# Patient Record
Sex: FEMALE | Race: WHITE | NOT HISPANIC OR LATINO | Employment: UNEMPLOYED | URBAN - METROPOLITAN AREA
[De-identification: names, ages, dates, MRNs, and addresses within clinical notes are randomized per-mention and may not be internally consistent; named-entity substitution may affect disease eponyms.]

---

## 2023-10-26 ENCOUNTER — ANCILLARY PROCEDURE (OUTPATIENT)
Dept: RADIOLOGY | Facility: HOSPITAL | Age: 33
End: 2023-10-26
Payer: COMMERCIAL

## 2023-10-26 ENCOUNTER — PHARMACY VISIT (OUTPATIENT)
Dept: PHARMACY | Facility: CLINIC | Age: 33
End: 2023-10-26
Payer: MEDICARE

## 2023-10-26 ENCOUNTER — HOSPITAL ENCOUNTER (OUTPATIENT)
Facility: HOSPITAL | Age: 33
Discharge: HOME | End: 2023-10-26
Payer: COMMERCIAL

## 2023-10-26 ENCOUNTER — HOSPITAL ENCOUNTER (OUTPATIENT)
Dept: VASCULAR MEDICINE | Facility: HOSPITAL | Age: 33
Discharge: HOME | End: 2023-10-26
Payer: COMMERCIAL

## 2023-10-26 ENCOUNTER — HOSPITAL ENCOUNTER (EMERGENCY)
Facility: HOSPITAL | Age: 33
Discharge: HOME | End: 2023-10-26
Attending: EMERGENCY MEDICINE
Payer: COMMERCIAL

## 2023-10-26 VITALS
BODY MASS INDEX: 26.22 KG/M2 | DIASTOLIC BLOOD PRESSURE: 61 MMHG | RESPIRATION RATE: 18 BRPM | HEIGHT: 63 IN | HEART RATE: 78 BPM | WEIGHT: 148 LBS | SYSTOLIC BLOOD PRESSURE: 116 MMHG | TEMPERATURE: 98.6 F | OXYGEN SATURATION: 99 %

## 2023-10-26 DIAGNOSIS — M79.661 PAIN IN RIGHT LOWER LEG: ICD-10-CM

## 2023-10-26 DIAGNOSIS — I82.411 ACUTE DEEP VEIN THROMBOSIS (DVT) OF FEMORAL VEIN OF RIGHT LOWER EXTREMITY (MULTI): Primary | ICD-10-CM

## 2023-10-26 LAB
ALBUMIN SERPL BCP-MCNC: 4.6 G/DL (ref 3.4–5)
ALP SERPL-CCNC: 76 U/L (ref 33–110)
ALT SERPL W P-5'-P-CCNC: 11 U/L (ref 7–45)
ANION GAP SERPL CALC-SCNC: 12 MMOL/L (ref 10–20)
APTT PPP: 33 SECONDS (ref 27–38)
AST SERPL W P-5'-P-CCNC: 15 U/L (ref 9–39)
BASOPHILS # BLD AUTO: 0.04 X10*3/UL (ref 0–0.1)
BASOPHILS NFR BLD AUTO: 0.5 %
BILIRUB SERPL-MCNC: 0.7 MG/DL (ref 0–1.2)
BUN SERPL-MCNC: 9 MG/DL (ref 6–23)
CALCIUM SERPL-MCNC: 9.6 MG/DL (ref 8.6–10.3)
CHLORIDE SERPL-SCNC: 102 MMOL/L (ref 98–107)
CO2 SERPL-SCNC: 28 MMOL/L (ref 21–32)
CREAT SERPL-MCNC: 0.69 MG/DL (ref 0.5–1.05)
EOSINOPHIL # BLD AUTO: 0.09 X10*3/UL (ref 0–0.7)
EOSINOPHIL NFR BLD AUTO: 1.1 %
ERYTHROCYTE [DISTWIDTH] IN BLOOD BY AUTOMATED COUNT: 12.4 % (ref 11.5–14.5)
GFR SERPL CREATININE-BSD FRML MDRD: >90 ML/MIN/1.73M*2
GLUCOSE SERPL-MCNC: 81 MG/DL (ref 74–99)
HCT VFR BLD AUTO: 44.2 % (ref 36–46)
HGB BLD-MCNC: 14.2 G/DL (ref 12–16)
IMM GRANULOCYTES # BLD AUTO: 0.02 X10*3/UL (ref 0–0.7)
IMM GRANULOCYTES NFR BLD AUTO: 0.3 % (ref 0–0.9)
INR PPP: 1.1 (ref 0.9–1.1)
LYMPHOCYTES # BLD AUTO: 1.79 X10*3/UL (ref 1.2–4.8)
LYMPHOCYTES NFR BLD AUTO: 22.6 %
MCH RBC QN AUTO: 27.6 PG (ref 26–34)
MCHC RBC AUTO-ENTMCNC: 32.1 G/DL (ref 32–36)
MCV RBC AUTO: 86 FL (ref 80–100)
MONOCYTES # BLD AUTO: 0.37 X10*3/UL (ref 0.1–1)
MONOCYTES NFR BLD AUTO: 4.7 %
NEUTROPHILS # BLD AUTO: 5.61 X10*3/UL (ref 1.2–7.7)
NEUTROPHILS NFR BLD AUTO: 70.8 %
NRBC BLD-RTO: 0 /100 WBCS (ref 0–0)
PLATELET # BLD AUTO: 357 X10*3/UL (ref 150–450)
PMV BLD AUTO: 8.8 FL (ref 7.5–11.5)
POTASSIUM SERPL-SCNC: 4.2 MMOL/L (ref 3.5–5.3)
PROT SERPL-MCNC: 7.9 G/DL (ref 6.4–8.2)
PROTHROMBIN TIME: 12.7 SECONDS (ref 9.8–12.8)
RBC # BLD AUTO: 5.14 X10*6/UL (ref 4–5.2)
SODIUM SERPL-SCNC: 138 MMOL/L (ref 136–145)
WBC # BLD AUTO: 7.9 X10*3/UL (ref 4.4–11.3)

## 2023-10-26 PROCEDURE — 85730 THROMBOPLASTIN TIME PARTIAL: CPT | Performed by: PHYSICIAN ASSISTANT

## 2023-10-26 PROCEDURE — 93971 EXTREMITY STUDY: CPT

## 2023-10-26 PROCEDURE — 93971 EXTREMITY STUDY: CPT | Performed by: RADIOLOGY

## 2023-10-26 PROCEDURE — 80053 COMPREHEN METABOLIC PANEL: CPT | Performed by: PHYSICIAN ASSISTANT

## 2023-10-26 PROCEDURE — 99284 EMERGENCY DEPT VISIT MOD MDM: CPT | Performed by: EMERGENCY MEDICINE

## 2023-10-26 PROCEDURE — 36415 COLL VENOUS BLD VENIPUNCTURE: CPT | Performed by: PHYSICIAN ASSISTANT

## 2023-10-26 PROCEDURE — RXMED WILLOW AMBULATORY MEDICATION CHARGE

## 2023-10-26 PROCEDURE — 93971 EXTREMITY STUDY: CPT | Performed by: SURGERY

## 2023-10-26 PROCEDURE — 85025 COMPLETE CBC W/AUTO DIFF WBC: CPT | Performed by: PHYSICIAN ASSISTANT

## 2023-10-26 PROCEDURE — 85610 PROTHROMBIN TIME: CPT | Performed by: PHYSICIAN ASSISTANT

## 2023-10-26 RX ORDER — APIXABAN 5 MG (74)
KIT ORAL
Qty: 74 TABLET | Refills: 0 | Status: SHIPPED | OUTPATIENT
Start: 2023-10-26 | End: 2024-01-10 | Stop reason: WASHOUT

## 2023-10-26 ASSESSMENT — LIFESTYLE VARIABLES
EVER FELT BAD OR GUILTY ABOUT YOUR DRINKING: NO
HAVE PEOPLE ANNOYED YOU BY CRITICIZING YOUR DRINKING: NO
HAVE YOU EVER FELT YOU SHOULD CUT DOWN ON YOUR DRINKING: NO
EVER HAD A DRINK FIRST THING IN THE MORNING TO STEADY YOUR NERVES TO GET RID OF A HANGOVER: NO
REASON UNABLE TO ASSESS: YES

## 2023-10-26 ASSESSMENT — COLUMBIA-SUICIDE SEVERITY RATING SCALE - C-SSRS
6. HAVE YOU EVER DONE ANYTHING, STARTED TO DO ANYTHING, OR PREPARED TO DO ANYTHING TO END YOUR LIFE?: NO
1. IN THE PAST MONTH, HAVE YOU WISHED YOU WERE DEAD OR WISHED YOU COULD GO TO SLEEP AND NOT WAKE UP?: NO
2. HAVE YOU ACTUALLY HAD ANY THOUGHTS OF KILLING YOURSELF?: NO

## 2023-10-26 ASSESSMENT — PAIN DESCRIPTION - ORIENTATION: ORIENTATION: RIGHT

## 2023-10-26 ASSESSMENT — PAIN DESCRIPTION - DESCRIPTORS: DESCRIPTORS: ACHING

## 2023-10-26 ASSESSMENT — PAIN DESCRIPTION - LOCATION: LOCATION: LEG

## 2023-10-26 ASSESSMENT — PAIN DESCRIPTION - PAIN TYPE: TYPE: ACUTE PAIN

## 2023-10-26 ASSESSMENT — PAIN SCALES - GENERAL: PAINLEVEL_OUTOF10: 2

## 2023-10-26 ASSESSMENT — PAIN - FUNCTIONAL ASSESSMENT: PAIN_FUNCTIONAL_ASSESSMENT: 0-10

## 2023-10-26 NOTE — ED TRIAGE NOTES
Pt presented to the ED with c/o right leg DVT. Pt was seen at the vascular lab today for a US and was positive for a DVT. Pt states that this has been going on for a while and has extreme pain in RLE where she was unable to walk. Pt recently had a trip to [place] and had a 14 hour flight there and back but pain started prior to trip. Pt does have nexplanon for birth control and has an extensive medical history with intracranial HTN and aneurysm. Pt has no history of blood clots and is not on a blood thinner. +2 pedal pulse with +1 edema to RLE. Pt able to move extremity with little to no pain.

## 2023-10-26 NOTE — Clinical Note
Zeinab Castellanos was seen and treated in our emergency department on 10/26/2023.  She may return to work on 10/27/2023.       If you have any questions or concerns, please don't hesitate to call.      No Carney MD

## 2023-11-01 PROBLEM — F81.9 LEARNING DISABILITY: Status: ACTIVE | Noted: 2023-11-01

## 2023-11-01 RX ORDER — ACETAZOLAMIDE 250 MG/1
TABLET ORAL
COMMUNITY
Start: 2023-09-02 | End: 2023-11-02 | Stop reason: WASHOUT

## 2023-11-02 ENCOUNTER — OFFICE VISIT (OUTPATIENT)
Dept: CARDIOLOGY | Facility: CLINIC | Age: 33
End: 2023-11-02
Payer: COMMERCIAL

## 2023-11-02 VITALS — HEART RATE: 80 BPM | SYSTOLIC BLOOD PRESSURE: 106 MMHG | DIASTOLIC BLOOD PRESSURE: 68 MMHG

## 2023-11-02 DIAGNOSIS — I82.411 ACUTE DEEP VEIN THROMBOSIS (DVT) OF FEMORAL VEIN OF RIGHT LOWER EXTREMITY (MULTI): Primary | ICD-10-CM

## 2023-11-02 PROBLEM — F81.9 LEARNING DISABILITY: Status: RESOLVED | Noted: 2023-11-01 | Resolved: 2023-11-02

## 2023-11-02 PROCEDURE — 1036F TOBACCO NON-USER: CPT | Performed by: INTERNAL MEDICINE

## 2023-11-02 PROCEDURE — 99204 OFFICE O/P NEW MOD 45 MIN: CPT | Performed by: INTERNAL MEDICINE

## 2023-11-02 NOTE — PATIENT INSTRUCTIONS
You can get compression socks at [employer] or any other store that sells durable medical goods.     Once you have decided where you are going to go for socks, call in advance and find out when the person who measures you for socks will be there. Plan to go fairly early in the day. Many people have swelling that is worse toward the end of the day. If you are measured at the end of the day, you may not get a good fit.    You should put on your compression socks first thing in the morning. Take them off before bed.    If you use lotion or moisturizer on your legs, do NOT put lotion or moisturizer on immediately before you put on your socks, as it will make them difficult to pull up. Use your lotion or moisturizer at night.    Compression socks can be hand-washed or washed in a mesh bag in the washing machine. Air dry them. Do NOT put them in the dryer.    I think it's a great idea to get back into the sports med folks and figure out what they think your injury was; if we feel like there was an injury with immobilization (rather than the blood clot already cooking) we likely will stop your anticoagulation at the 3-6 month eleazar.    Should you have questions, please do not hesitate to call my office at [phone].    Please schedule a follow-up visit at [facility] in about 10-12 weeks.

## 2023-11-02 NOTE — PROGRESS NOTES
Referred by Anthony Easley MD    32 y/o woman with recent right leg DVT here for evaluation. Here with her .    She reports a possible right leg injury at the end of September; had pushed her mother around in a wheelchair for about 20 minutes on uneven terrain. Noticed the next day that her right calf had started to feel sore. Walked about 4-5 miles that day and right calf became more sore.    Then traveled to [facility] for three weeks. Leg was sore and swollen while there and she was pretty much in a wheelchair the entire time she was there. Went to urgent care there (which she says was limited by language barrier) where had no imaging and was treated symptomatically with analgesics.    When she got home symptoms had improved somewhat but she did seek care with a sports medicine physician outside our system, who referred her for venous duplex ultrasound.    She has no prior personal or family history of VTE.    She was sent to ED from vascular lab and after discussion was started on anticoagulation with apixaban. Symptoms have continued to improve on anticoagulation.    She has etonogestrol implant (Nexplanon) for contraception.    She denies bleeding including epistaxis, gingival bleeding, hemoptysis, hematemesis, hematochezia, melena, hematuria, and vaginal bleeding.    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    She is up to date on age/gender-appropriate malignancy screening.    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 She had a squamous cell cancer removed from her cheek. 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 Easley's  "report.    She has no prior personal history of thrombosis. There is a family history of thrombosis in her maternal grandfather, who had VTE and was on warfarin. He had fatal recurrence of VTE despite anticoagulation. Maternal first cousin had fatal VTE with pregnancy. Mother has had several \"mini-strokes.\"    Other family history includes sudden death of maternal grandmother and fatal aneurysm rupture in her sister.    She does not smoke or drink alcohol. Recently moved here from [place]. Originally from [facility].    Current Outpatient Medications   Medication Sig Dispense Refill    apixaban (Eliquis DVT-PE Treat 30D Start) 5 mg (74 tabs) tablets,dose pack Take 10 mg by mouth 2 times a day for 7 days, THEN 5 mg 2 times a day for 23 days. 74 tablet 0    apixaban (Eliquis) 5 mg tablet Take 1 tablet (5 mg) by mouth 2 times a day. 60 tablet 11    etonogestrel (NEXPLANON SDRM) Inject under the skin.      multivitamin with minerals (multivitamin-iron-folic acid) tablet once daily.       No current facility-administered medications for this visit.     Blood pressure 106/68, pulse 80.  General: well-developed, well-nourished, no distress  Head: normocephalic, atraumatic  Eyes: PERRL, anicteric sclerae, no conjunctival injection  ENT: normal oropharynx  Neck: supple, no carotid bruits, no JVD  Lungs: normal respiratory effort, clear to auscultation bilaterally  Heart: regular, normal S1 and S2, no murmurs, rubs or gallops  Abdomen: normal active bowel sounds, soft, non-distended, non-tender  Extremities: no cyanosis or clubbing  Vascular: trace right leg edema, pulses 2+ and symmetric  Musculoskeletal: no deformities  Neurological: alert and oriented, no gross neurological deficits  Psychological: normal mood and affect     Venous duplex ultrasound 10/26/2023  **CRITICAL RESULT**  Critical Result: Acute DVT right dst FV, popliteal vein, posterior tbial and peroneal veins.  Notification called to JEAN Sousa/  " Tabby on 10/26/2023 at 11:23:34 AM by Lily Rod RVT.     CONCLUSIONS:  Right Lower Venous: There is acute occlusive deep vein thrombosis visualized in the distal femoral vein. There is acute non-occlusive deep vein thrombosis visualized in the popliteal, posterior tibial and peroneal veins.  Left Lower Venous: Left common femoral vein is negative for deep vein thrombus.      Component      Latest Ref Rng 10/26/2023   WBC      4.4 - 11.3 x10*3/uL 7.9    nRBC      0.0 - 0.0 /100 WBCs 0.0    RBC      4.00 - 5.20 x10*6/uL 5.14    HEMOGLOBIN      12.0 - 16.0 g/dL 14.2    HEMATOCRIT      36.0 - 46.0 % 44.2    MCV      80 - 100 fL 86    MCH      26.0 - 34.0 pg 27.6    MCHC      32.0 - 36.0 g/dL 32.1    RED CELL DISTRIBUTION WIDTH      11.5 - 14.5 % 12.4    Platelets      150 - 450 x10*3/uL 357    MEAN PLATELET VOLUME      7.5 - 11.5 fL 8.8    Neutrophils %      40.0 - 80.0 % 70.8    Immature Granulocytes %, Automated      0.0 - 0.9 % 0.3    Lymphocytes %      13.0 - 44.0 % 22.6    Monocytes %      2.0 - 10.0 % 4.7    Eosinophils %      0.0 - 6.0 % 1.1    Basophils %      0.0 - 2.0 % 0.5    Neutrophils Absolute      1.20 - 7.70 x10*3/uL 5.61    Immature Granulocytes Absolute, Automated      0.00 - 0.70 x10*3/uL 0.02    Lymphocytes Absolute      1.20 - 4.80 x10*3/uL 1.79    Monocytes Absolute      0.10 - 1.00 x10*3/uL 0.37    Eosinophils Absolute      0.00 - 0.70 x10*3/uL 0.09    Basophils Absolute      0.00 - 0.10 x10*3/uL 0.04    GLUCOSE      74 - 99 mg/dL 81    SODIUM      136 - 145 mmol/L 138    POTASSIUM      3.5 - 5.3 mmol/L 4.2    CHLORIDE      98 - 107 mmol/L 102    Bicarbonate      21 - 32 mmol/L 28    Anion Gap      10 - 20 mmol/L 12    Blood Urea Nitrogen      6 - 23 mg/dL 9    Creatinine      0.50 - 1.05 mg/dL 0.69    EGFR      >60 mL/min/1.73m*2 >90    Calcium      8.6 - 10.3 mg/dL 9.6    Albumin      3.4 - 5.0 g/dL 4.6    Alkaline Phosphatase      33 - 110 U/L 76    Total Protein      6.4 - 8.2 g/dL 7.9     AST      9 - 39 U/L 15    Bilirubin Total      0.0 - 1.2 mg/dL 0.7    ALT      7 - 45 U/L 11    Protime      9.8 - 12.8 seconds 12.7    INR      0.9 - 1.1  1.1    aPTT      27 - 38 seconds 33      Assessment and plan:  Acute deep vein thrombosis of the right distal femoral, popliteal, and calf veins. Unclear whether there was an antecedent injury in September in combination with long plane flight that set her up for DVT, versus unprovoked event that was declaring itself in September coincident with activity. May be worth discussing with sports med whether they think there was an injury like muscle tear, but the activity she describes (walking, walking while pushing a wheelchair) does not seem intense enough to cause a muscle tear or soft tissue injury. I would favor that this was an unprovoked event.    Anticipated duration of anticoagulation is a MINIMUM of 3-6 months. We will discuss at subsequent visit but I think I would favor long-term anticoagulation.    She and her  had questions about pregnancy, as they'd like to have children. I answered questions but deferred some to the next visit. I did ask them to avoid pregnancy for at least six months. OK to continue with Nexplanon while anticoagulated.    Compression socks are a great idea if she finds them helpful. I gave her a prescription and instructions on how to obtain.    Follow up in 3 months.    Essie rGoss MD

## 2023-12-04 ENCOUNTER — OFFICE VISIT (OUTPATIENT)
Dept: OBSTETRICS AND GYNECOLOGY | Facility: CLINIC | Age: 33
End: 2023-12-04
Payer: COMMERCIAL

## 2023-12-04 VITALS
DIASTOLIC BLOOD PRESSURE: 62 MMHG | SYSTOLIC BLOOD PRESSURE: 110 MMHG | HEIGHT: 63 IN | WEIGHT: 151.38 LBS | BODY MASS INDEX: 26.82 KG/M2

## 2023-12-04 DIAGNOSIS — Z31.69 ENCOUNTER FOR PRECONCEPTION CONSULTATION: Primary | ICD-10-CM

## 2023-12-04 PROBLEM — I72.9 ANEURYSM (CMS-HCC): Status: ACTIVE | Noted: 2023-12-04

## 2023-12-04 PROBLEM — G89.29 CHRONIC HEADACHES: Status: ACTIVE | Noted: 2023-01-12

## 2023-12-04 PROBLEM — G96.02: Status: ACTIVE | Noted: 2023-12-04

## 2023-12-04 PROBLEM — C44.92 SQUAMOUS CELL SKIN CANCER: Status: ACTIVE | Noted: 2020-11-01

## 2023-12-04 PROBLEM — G93.2 BENIGN INTRACRANIAL HYPERTENSION: Status: ACTIVE | Noted: 2023-04-01

## 2023-12-04 PROBLEM — R51.9 CHRONIC HEADACHES: Status: ACTIVE | Noted: 2023-01-12

## 2023-12-04 PROCEDURE — 99202 OFFICE O/P NEW SF 15 MIN: CPT | Performed by: NURSE PRACTITIONER

## 2023-12-04 PROCEDURE — 1036F TOBACCO NON-USER: CPT | Performed by: NURSE PRACTITIONER

## 2023-12-04 RX ORDER — GLUCOSAM/CHONDRO/HERB 149/HYAL 750-100 MG
TABLET ORAL
COMMUNITY

## 2023-12-04 RX ORDER — UBIDECARENONE 30 MG
30 CAPSULE ORAL DAILY
COMMUNITY

## 2023-12-04 NOTE — PROGRESS NOTES
"Assessment/Plan   Diagnoses and all orders for this visit:  Encounter for preconception consultation  -     Referral to Maternal Fetal Medicine; Future    Discussed with patient need for preconception counseling with MFM d/t medical history. Patient and partner verbalize understanding.     Follow up for Nexplanon removal when desired or PRN for routine GYN.   Shana Jameson, APRN-CNM, APRN-CNP    Subjective   Zeinab Castellanos is a 33 y.o. female who presents for preconception counseling.    HPI    Patient currently has nexplanon in place (placed about 2 years ago). She has extensive medical history including aneurysm, recent DVT (currently on eliquis for treatment), and prior CSF leak. Patient desires to try to get pregnant with the right guidance.     Menstrual History:  OB History          0    Para   0    Term   0       0    AB   0    Living   0         SAB   0    IAB   0    Ectopic   0    Multiple   0    Live Births   0                Patient's last menstrual period was 2023 (approximate).       Objective   /62   Ht 1.6 m (5' 3\")   Wt 68.7 kg (151 lb 6.1 oz)   LMP 2023 (Approximate)   BMI 26.82 kg/m²     Physical Exam  Constitutional:       Appearance: Normal appearance.   Pulmonary:      Effort: Pulmonary effort is normal.   Neurological:      General: No focal deficit present.      Mental Status: She is alert and oriented to person, place, and time. Mental status is at baseline.   Psychiatric:         Mood and Affect: Mood normal.         Behavior: Behavior normal.         Thought Content: Thought content normal.         Judgment: Judgment normal.       "

## 2023-12-26 ENCOUNTER — APPOINTMENT (OUTPATIENT)
Dept: MATERNAL FETAL MEDICINE | Facility: HOSPITAL | Age: 33
End: 2023-12-26
Payer: COMMERCIAL

## 2024-01-10 ENCOUNTER — OFFICE VISIT (OUTPATIENT)
Dept: CARDIOLOGY | Facility: HOSPITAL | Age: 34
End: 2024-01-10
Payer: COMMERCIAL

## 2024-01-10 VITALS
SYSTOLIC BLOOD PRESSURE: 116 MMHG | OXYGEN SATURATION: 100 % | WEIGHT: 151.68 LBS | HEART RATE: 86 BPM | DIASTOLIC BLOOD PRESSURE: 75 MMHG | BODY MASS INDEX: 26.87 KG/M2

## 2024-01-10 DIAGNOSIS — I82.411 ACUTE DEEP VEIN THROMBOSIS (DVT) OF FEMORAL VEIN OF RIGHT LOWER EXTREMITY (MULTI): Primary | ICD-10-CM

## 2024-01-10 PROCEDURE — 1036F TOBACCO NON-USER: CPT | Performed by: INTERNAL MEDICINE

## 2024-01-10 PROCEDURE — 99214 OFFICE O/P EST MOD 30 MIN: CPT | Performed by: INTERNAL MEDICINE

## 2024-01-10 NOTE — PATIENT INSTRUCTIONS
I would recommend a minimum of 3 months of anticoagulation--which would get you to January 26.     If you would like to see a thrombosis (clotting) expert in [place], you can try to get in with Dr. Graciela Munoz or one of her colleagues at the [facility]. Dr. Munoz is a hematologist who runs the Thrombosis and Hemostasis Program.

## 2024-01-10 NOTE — PROGRESS NOTES
Chief Complaint:   Deep Vein Thrombosis     History Of Present Illness:    Zeinab Castellanos is a 34 y/o woman who follows up for right leg DVT diagnosed 10/26/2023. She is on anticoagulation with apixaban 5 mg bid and doing well. Denies unusual bleeding. Planning move back to [place] at the end of this month.    Per my last note:    She reports a possible right leg injury at the end of September; had pushed her mother around in a wheelchair for about 20 minutes on uneven terrain. Noticed the next day that her right calf had started to feel sore. Walked about 4-5 miles that day and right calf became more sore.     Then traveled to [facility] for three weeks. Leg was sore and swollen while there and she was pretty much in a wheelchair the entire time she was there. Went to urgent care there (which she says was limited by language barrier) where had no imaging and was treated symptomatically with analgesics.     When she got home symptoms had improved somewhat but she did seek care with a sports medicine physician outside our system, who referred her for venous duplex ultrasound.     She has no prior personal or family history of VTE.     She was sent to ED from vascular lab and after discussion was started on anticoagulation with apixaban. Symptoms have continued to improve on anticoagulation.     She has etonogestrol (progestin) implant (Nexplanon) for contraception.     She denies bleeding including epistaxis, gingival bleeding, hemoptysis, hematemesis, hematochezia, melena, hematuria, and vaginal bleeding.     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     She is up to date on age/gender-appropriate malignancy screening.     Other personal medical history includes orthostatic  "headaches presumed due to spontaneous CSF leak; underwent blood patch at [facility]. She had a squamous cell cancer removed from her cheek. 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 Easley's report.     She has no prior personal history of thrombosis. There is a family history of thrombosis in her maternal grandfather, who had VTE and was on warfarin. He had fatal recurrence of VTE despite anticoagulation. Maternal first cousin had fatal VTE with pregnancy. Mother has had several \"mini-strokes.\"     Other family history includes sudden death of maternal grandmother and fatal aneurysm rupture in her sister.     She does not smoke or drink alcohol. Recently moved here from [place]. Originally from [place].     Past Medical History:   Diagnosis Date    Aneurysm (CMS/HCC)     DVT of leg (deep venous thrombosis) (CMS/HCC)     Lumbar puncture headache     Skin cancer     Spinal cerebrospinal fluid leak, spontaneous      Past Surgical History:   Procedure Laterality Date    MR HEAD ANGIO W AND WO IV CONTRAST  8/29/2022    MR HEAD ANGIO W AND WO IV CONTRAST 8/29/2022           Social History:  She reports that she has never smoked. She has never used smokeless tobacco. She reports that she does not currently use alcohol. She reports that she does not use drugs.    Family History:  No family history on file.     Allergies:  Codeine, Dimenhydrinate, and Dexamethasone    Outpatient Medications:  Current Outpatient Medications   Medication Instructions    apixaban (Eliquis DVT-PE Treat 30D Start) 5 mg (74 tabs) tablets,dose pack Take 10 mg by mouth 2 times a day for 7 days, THEN 5 mg 2 times a day for 23 days.    apixaban (ELIQUIS) 5 mg, oral, 2 times daily    co-enzyme Q-10 30 mg, oral, Daily    etonogestrel (NEXPLANON SDRM) subcutaneous    multivitamin with minerals (multivitamin-iron-folic acid) tablet Daily    omega 3-dha-epa-fish oil (Fish OiL) 1,000 mg (120 mg-180 mg) capsule  " "    Blood pressure 116/75, pulse 86, weight 68.8 kg (151 lb 10.8 oz), SpO2 100 %.    Physical Exam:  No distress  No JVD or carotid bruits  Lungs clear bilaterally  Heart regular and without murmurs  Abdomen soft and non-tender  No leg swelling  Pulses intact       Last Labs:  CBC -  Lab Results   Component Value Date    WBC 7.9 10/26/2023    HGB 14.2 10/26/2023    HCT 44.2 10/26/2023    MCV 86 10/26/2023     10/26/2023       CMP -  Lab Results   Component Value Date    CALCIUM 9.6 10/26/2023    PROT 7.9 10/26/2023    ALBUMIN 4.6 10/26/2023    AST 15 10/26/2023    ALT 11 10/26/2023    ALKPHOS 76 10/26/2023    BILITOT 0.7 10/26/2023       LIPID PANEL -   No results found for: \"CHOL\", \"TRIG\", \"HDL\", \"CHHDL\", \"LDLF\", \"VLDL\", \"NHDL\"    RENAL FUNCTION PANEL -   Lab Results   Component Value Date    GLUCOSE 81 10/26/2023     10/26/2023    K 4.2 10/26/2023     10/26/2023    CO2 28 10/26/2023    ANIONGAP 12 10/26/2023    BUN 9 10/26/2023    CREATININE 0.69 10/26/2023    CALCIUM 9.6 10/26/2023    ALBUMIN 4.6 10/26/2023          Assessment/Plan   Diagnoses and all orders for this visit:  Acute deep vein thrombosis (DVT) of femoral vein of right lower extremity (CMS/HCC)  I would recommend a minimum of 3 months of anticoagulation--which would get you to January 26.     If you would like to see a thrombosis (clotting) expert in [place], you can try to get in with Dr. Graciela Munoz or one of her colleagues at the [facility] [place] [facility]. Dr. Munoz is a hematologist who runs the Thrombosis and Hemostasis Program.    Essie Gross MD  "

## 2024-01-17 ENCOUNTER — APPOINTMENT (OUTPATIENT)
Dept: CARDIOLOGY | Facility: HOSPITAL | Age: 34
End: 2024-01-17
Payer: COMMERCIAL

## 2024-01-31 ENCOUNTER — APPOINTMENT (OUTPATIENT)
Dept: MATERNAL FETAL MEDICINE | Facility: CLINIC | Age: 34
End: 2024-01-31
Payer: COMMERCIAL

## 2024-02-05 ENCOUNTER — APPOINTMENT (OUTPATIENT)
Dept: PRIMARY CARE | Facility: CLINIC | Age: 34
End: 2024-02-05
Payer: COMMERCIAL